# Patient Record
Sex: FEMALE | Race: WHITE | NOT HISPANIC OR LATINO | Employment: FULL TIME | URBAN - METROPOLITAN AREA
[De-identification: names, ages, dates, MRNs, and addresses within clinical notes are randomized per-mention and may not be internally consistent; named-entity substitution may affect disease eponyms.]

---

## 2017-02-24 ENCOUNTER — ALLSCRIPTS OFFICE VISIT (OUTPATIENT)
Dept: OTHER | Facility: OTHER | Age: 57
End: 2017-02-24

## 2017-05-19 ENCOUNTER — APPOINTMENT (OUTPATIENT)
Dept: LAB | Facility: CLINIC | Age: 57
End: 2017-05-19
Payer: COMMERCIAL

## 2017-05-19 ENCOUNTER — TRANSCRIBE ORDERS (OUTPATIENT)
Dept: LAB | Facility: CLINIC | Age: 57
End: 2017-05-19

## 2017-05-19 DIAGNOSIS — R09.89 OTHER SYMPTOMS INVOLVING CARDIOVASCULAR SYSTEM: Primary | ICD-10-CM

## 2017-05-19 DIAGNOSIS — R09.89 OTHER SYMPTOMS INVOLVING CARDIOVASCULAR SYSTEM: ICD-10-CM

## 2017-05-19 LAB — VENIPUNCTURE: NORMAL

## 2017-05-19 PROCEDURE — 36415 COLL VENOUS BLD VENIPUNCTURE: CPT

## 2017-06-19 ENCOUNTER — TRANSCRIBE ORDERS (OUTPATIENT)
Dept: ADMINISTRATIVE | Facility: HOSPITAL | Age: 57
End: 2017-06-19

## 2017-06-19 DIAGNOSIS — E04.1 NONTOXIC UNINODULAR GOITER: Primary | ICD-10-CM

## 2017-06-26 ENCOUNTER — HOSPITAL ENCOUNTER (OUTPATIENT)
Dept: RADIOLOGY | Facility: HOSPITAL | Age: 57
Discharge: HOME | End: 2017-06-26
Attending: SURGERY
Payer: COMMERCIAL

## 2017-06-26 DIAGNOSIS — E04.1 NONTOXIC UNINODULAR GOITER: ICD-10-CM

## 2017-06-26 PROCEDURE — 76536 US EXAM OF HEAD AND NECK: CPT

## 2018-01-14 VITALS
DIASTOLIC BLOOD PRESSURE: 98 MMHG | HEART RATE: 80 BPM | BODY MASS INDEX: 20.76 KG/M2 | OXYGEN SATURATION: 98 % | WEIGHT: 103 LBS | HEIGHT: 59 IN | TEMPERATURE: 100 F | RESPIRATION RATE: 18 BRPM | SYSTOLIC BLOOD PRESSURE: 147 MMHG

## 2023-03-17 ENCOUNTER — PREP FOR PROCEDURE (OUTPATIENT)
Dept: GASTROENTEROLOGY | Facility: CLINIC | Age: 63
End: 2023-03-17

## 2023-03-17 ENCOUNTER — TELEPHONE (OUTPATIENT)
Dept: GASTROENTEROLOGY | Facility: CLINIC | Age: 63
End: 2023-03-17

## 2023-03-17 DIAGNOSIS — Z12.11 SCREENING FOR COLON CANCER: Primary | ICD-10-CM

## 2023-03-17 NOTE — TELEPHONE ENCOUNTER
03/17/23  Screened by: Tammie Kinsey    Referring Provider Dorsi    Pre- Screening: There is no height or weight on file to calculate BMI  Has patient been referred for a routine screening Colonoscopy? yes  Is the patient between 39-70 years old? yes      Previous Colonoscopy no   If yes:    Date:     Facility:     Reason:       SCHEDULING STAFF: If the patient is between 45yrs-49yrs, please advise patient to confirm benefits/coverage with their insurance company for a routine screening colonoscopy, some insurance carriers will only cover at [address] or older  If the patient is over 66years old, please schedule an office visit  Does the patient want to see a Gastroenterologist prior to their procedure OR are they having any GI symptoms? no    Has the patient been hospitalized or had abdominal surgery in the past 6 months? no    Does the patient use supplemental oxygen? no    Does the patient take Coumadin, Lovenox, Plavix, Elliquis, Xarelto, or other blood thinning medication? no    Has the patient had a stroke, cardiac event, or stent placed in the past year? no    SCHEDULING STAFF: If patient answers NO to above questions, then schedule procedure  If patient answers YES to above questions, then schedule office appointment  PASSED OA    If patient is between 45yrs - 49yrs, please advise patient that we will have to confirm benefits & coverage with their insurance company for a routine screening colonoscopy

## 2023-04-27 ENCOUNTER — ANESTHESIA (OUTPATIENT)
Dept: GASTROENTEROLOGY | Facility: AMBULARY SURGERY CENTER | Age: 63
End: 2023-04-27

## 2023-04-27 ENCOUNTER — ANESTHESIA EVENT (OUTPATIENT)
Dept: GASTROENTEROLOGY | Facility: AMBULARY SURGERY CENTER | Age: 63
End: 2023-04-27

## 2023-04-27 ENCOUNTER — HOSPITAL ENCOUNTER (OUTPATIENT)
Dept: GASTROENTEROLOGY | Facility: AMBULARY SURGERY CENTER | Age: 63
Setting detail: OUTPATIENT SURGERY
Discharge: HOME | End: 2023-04-27
Attending: INTERNAL MEDICINE | Admitting: INTERNAL MEDICINE

## 2023-04-27 VITALS
HEART RATE: 78 BPM | BODY MASS INDEX: 25.6 KG/M2 | WEIGHT: 127 LBS | RESPIRATION RATE: 18 BRPM | DIASTOLIC BLOOD PRESSURE: 75 MMHG | TEMPERATURE: 97.3 F | HEIGHT: 59 IN | SYSTOLIC BLOOD PRESSURE: 152 MMHG | OXYGEN SATURATION: 100 %

## 2023-04-27 DIAGNOSIS — Z12.11 SCREENING FOR COLON CANCER: ICD-10-CM

## 2023-04-27 RX ORDER — LIDOCAINE HYDROCHLORIDE 10 MG/ML
INJECTION, SOLUTION EPIDURAL; INFILTRATION; INTRACAUDAL; PERINEURAL AS NEEDED
Status: DISCONTINUED | OUTPATIENT
Start: 2023-04-27 | End: 2023-04-27

## 2023-04-27 RX ORDER — SODIUM CHLORIDE, SODIUM LACTATE, POTASSIUM CHLORIDE, CALCIUM CHLORIDE 600; 310; 30; 20 MG/100ML; MG/100ML; MG/100ML; MG/100ML
INJECTION, SOLUTION INTRAVENOUS CONTINUOUS PRN
Status: DISCONTINUED | OUTPATIENT
Start: 2023-04-27 | End: 2023-04-27

## 2023-04-27 RX ORDER — PROPOFOL 10 MG/ML
INJECTION, EMULSION INTRAVENOUS AS NEEDED
Status: DISCONTINUED | OUTPATIENT
Start: 2023-04-27 | End: 2023-04-27

## 2023-04-27 RX ORDER — PROPOFOL 10 MG/ML
INJECTION, EMULSION INTRAVENOUS CONTINUOUS PRN
Status: DISCONTINUED | OUTPATIENT
Start: 2023-04-27 | End: 2023-04-27

## 2023-04-27 RX ADMIN — PROPOFOL 30 MG: 10 INJECTION, EMULSION INTRAVENOUS at 10:06

## 2023-04-27 RX ADMIN — LIDOCAINE HYDROCHLORIDE 5 ML: 10 INJECTION, SOLUTION EPIDURAL; INFILTRATION; INTRACAUDAL; PERINEURAL at 10:03

## 2023-04-27 RX ADMIN — PROPOFOL 140 MCG/KG/MIN: 10 INJECTION, EMULSION INTRAVENOUS at 10:03

## 2023-04-27 RX ADMIN — SODIUM CHLORIDE, SODIUM LACTATE, POTASSIUM CHLORIDE, AND CALCIUM CHLORIDE: .6; .31; .03; .02 INJECTION, SOLUTION INTRAVENOUS at 09:45

## 2023-04-27 RX ADMIN — PROPOFOL 100 MG: 10 INJECTION, EMULSION INTRAVENOUS at 10:03

## 2023-04-27 NOTE — H&P
"History and Physical -  [facility]  Jeri Saint 57 y o  female MRN: [id number]                  HPI: Jeri Saint is a 57y o  year old female who presents for screening colonoscopy      REVIEW OF SYSTEMS: Per the HPI, and otherwise unremarkable  Historical Information   Past Medical History:   Diagnosis Date   • Anxiety    • Depression    • Fatty liver    • Hypercholesteremia    • Hypertension      Past Surgical History:   Procedure Laterality Date   • NO PAST SURGERIES       Social History   Social History     Substance and Sexual Activity   Alcohol Use Yes    Comment: socially     Social History     Substance and Sexual Activity   Drug Use Not Currently     Social History     Tobacco Use   Smoking Status Former   • Years: 25 00   • Types: Cigarettes   • Passive exposure: Past   Smokeless Tobacco Never     History reviewed  No pertinent family history  Meds/Allergies     (Not in a hospital admission)      No Known Allergies    Objective     /56   Pulse 66   Temp (!) 97 3 °F (36 3 °C) (Temporal)   Resp 18   Ht 4' 11\" (1 499 m)   Wt 57 6 kg (127 lb)   SpO2 100%   BMI 25 65 kg/m²       PHYSICAL EXAM    Gen: NAD  CV: RRR  CHEST: Clear  ABD: soft, NT/ND  EXT: no edema      ASSESSMENT/PLAN:  This is a 57y o  year old female here for colonoscopy, and she is stable and optimized for her procedure        "

## 2023-04-27 NOTE — ANESTHESIA POSTPROCEDURE EVALUATION
Post-Op Assessment Note    CV Status:  Stable       Mental Status:  Sleepy   Hydration Status:  Stable   PONV Controlled:  Controlled   Airway Patency:  Patent      Post Op Vitals Reviewed: Yes      Staff: CRNA         No notable events documented      BP   132/65   Temp     Pulse  76   Resp   20   SpO2   100

## 2023-06-22 ENCOUNTER — OFFICE VISIT (OUTPATIENT)
Dept: GASTROENTEROLOGY | Facility: CLINIC | Age: 63
End: 2023-06-22

## 2023-06-22 VITALS
SYSTOLIC BLOOD PRESSURE: 172 MMHG | BODY MASS INDEX: 25.6 KG/M2 | DIASTOLIC BLOOD PRESSURE: 99 MMHG | WEIGHT: 127 LBS | HEIGHT: 59 IN | HEART RATE: 85 BPM

## 2023-06-22 DIAGNOSIS — R79.89 ELEVATED LFTS: ICD-10-CM

## 2023-06-22 DIAGNOSIS — R11.2 NAUSEA AND VOMITING, UNSPECIFIED VOMITING TYPE: ICD-10-CM

## 2023-06-22 DIAGNOSIS — R10.13 EPIGASTRIC PAIN: Primary | ICD-10-CM

## 2023-06-22 DIAGNOSIS — K63.5 POLYP OF COLON, UNSPECIFIED PART OF COLON, UNSPECIFIED TYPE: ICD-10-CM

## 2023-06-22 NOTE — PROGRESS NOTES
[facility] - Outpatient Follow-up Note  Monica Wendi 57 y o  female MRN: [id number]  Encounter: [id number]          ASSESSMENT AND PLAN:        1  Epigastric pain  Patient with pain which is upper endoscopy for  evaluation and she does intermittent nausea and vomiting as below and will plan for EGD for further evaluation  We will evaluate for gastritis peptic ulcer disease as she does take NSAIDs every couple days    2  Nausea and vomiting  Patient with nausea which occurs about once a month, no biliary abnormality seen on recent ultrasound or CAT scan    3  Elevated LFTs  Patient with elevated LFTs and when reviewing it appears that she has a chronically elevated alkaline phosphatase and did have alk phos isoenzymes which did not reveal any increased fractionation but she also had a positive hepatitis C antibody but HCVRNA PCR was negative, will order serologic work-up    4  Polyp of colon, unspecified part of colon, unspecified type  Patient with history of colon polyps with recent colonoscopy in April of this year will need repeat colonoscopy in 2028 for colon polyps    ______________________________________________________________________    SUBJECTIVE:      78-year-old female who presents today for office visit     Patient was seen in ER for nausea vomiting and epigastric pain  CAT scan was done at that time which showed no acute inflammatory changes  Patient was noted at that time to have a mildly elevated alkaline phosphatase at 118 and AST of 59 otherwise LFTs were normal     Recently had colonoscopy which showed as well as 2 small internal hemorrhoids but all other observed locations were normal   Polyps were tubular adenoma as well as hyperplastic polyp and recommended repeat colonoscopy in 5 years  Pt currently reports intermittent episodes of vomiting and abdominal pain  Vomiting occurs about once per month or every other month   States it usually consists of food and denies hematemesis or coffee-ground appearance  She thinks that it may be related to stress  She has not noticed any triggers such as dietary triggers  Abdominal pain is intermittent and overall infrequent but localized to the epigastric area  She denies diarrhea but reports that she will experience constipation at times with associated bloating and sometimes takes Metamucil  She denies dysphagia, odynophagia, heartburn, or skin changes  She reports that she drinks 1-2 glasses of wine every day or every other day  She takes 2 tablets of Advil every couple of days if she feels achy or has a headache  Her appetite has been good and she tries to avoid eating greasy foods  She notes overall weight gain since she quit smoking about a year ago  Family history is significant for grandmother with diverticulosis, denies any known family history of GI malignancy  Patient had reported that she saw someone in [place] and it appears that she had elastography as well as an ultrasound of her liver and the elastography showed Metavir score of F2-F3 indicating moderate fibrosis  She also had a regular ultrasound which just revealed hepatic steatosis  Common bile duct was normal in size with no evidence of cholelithiasis within the gallbladder  REVIEW OF SYSTEMS IS OTHERWISE NEGATIVE  Historical Information   Past Medical History:   Diagnosis Date   • Anxiety    • Depression    • Fatty liver    • Hypercholesteremia    • Hypertension      Past Surgical History:   Procedure Laterality Date   • NO PAST SURGERIES       Social History   Social History     Substance and Sexual Activity   Alcohol Use Yes    Comment: socially     Social History     Substance and Sexual Activity   Drug Use Not Currently     Social History     Tobacco Use   Smoking Status Former   • Years: 25 00   • Types: Cigarettes   • Passive exposure: Past   Smokeless Tobacco Never     History reviewed  No pertinent family history      Meds/Allergies "      Current Outpatient Medications:   •  ALPRAZolam (XANAX) 0 25 mg tablet  •  irbesartan (AVAPRO) 75 mg tablet  •  Multiple Vitamins-Minerals (MULTIVITAMIN ADULT EXTRA C PO)  •  ondansetron (Zofran ODT) 4 mg disintegrating tablet  •  Rosuvastatin Calcium (CRESTOR PO)  •  sertraline (ZOLOFT) 100 mg tablet    No Known Allergies        Objective     Blood pressure (!) 172/99, pulse 85, height 4' 11\" (1 499 m), weight 57 6 kg (127 lb)  Body mass index is 25 65 kg/m²  PHYSICAL EXAM:      General Appearance:   Alert, cooperative, no distress   HEENT:   Normocephalic, atraumatic, anicteric      Neck:  Supple, symmetrical, trachea midline   Lungs:   Clear to auscultation bilaterally; no rales, rhonchi or wheezing; respirations unlabored    Heart[de-identified]   Regular rate and rhythm; no murmur, rub, or gallop  Abdomen:   Soft, non-tender, non-distended; normal bowel sounds; no masses, no organomegaly    Genitalia:   Deferred    Rectal:   Deferred    Extremities:  No cyanosis, clubbing or edema    Pulses:  2+ and symmetric    Skin:  No jaundice, rashes, or lesions    Lymph nodes:  No palpable cervical lymphadenopathy        Lab Results:   No visits with results within 1 Day(s) from this visit     Latest known visit with results is:   Hospital Outpatient Visit on 04/27/2023   Component Date Value   • Case Report 04/27/2023                      Value:Surgical Pathology Report                         Case: [id number]                                   Authorizing Provider:  Donovan Blunt MD Collected:           04/27/2023 1012              Ordering Location:     [facility]   Received:            04/27/2023 [address]                                                                       Pathologist:           Steven Mcmullen MD                                                         Specimens:   A) - Large Intestine, Hepatic Flexure, polyp bx                             " B) - Rectum, polyp bx                                                                     • Final Diagnosis 04/27/2023                      Value: This result contains rich text formatting which cannot be displayed here  • Additional Information 04/27/2023                      Value: This result contains rich text formatting which cannot be displayed here  • Synoptic Checklist 04/27/2023                      Value:                            COLON/RECTUM POLYP FORM - GI - All Specimens                                                                                     :    Adenoma(s)                                                         : Other     • Gross Description 04/27/2023                      Value: This result contains rich text formatting which cannot be displayed here  Radiology Results:   No results found    Answers for HPI/ROS submitted by the patient on 6/15/2023  Chronicity: recurrent  Onset: more than 1 month ago  Onset quality: undetermined  Frequency: every several days  Episode duration: 6 Hours  Progression since onset: gradually worsening  Pain location: epigastric region  Pain - numeric: 5/10  Pain quality: a sensation of fullness  Radiates to: does not radiate  anorexia: No  arthralgias: No  belching: Yes  constipation: Yes  diarrhea: No  dysuria: No  fever: No  flatus: Yes  frequency: No  headaches: No  hematochezia: No  hematuria: No  melena: No  myalgias: No  nausea: Yes  weight loss: No  vomiting: Yes  Aggravated by: certain positions  Relieved by: nothing  Diagnostic workup: CT scan, lower endoscopy, ultrasound

## 2023-07-01 ENCOUNTER — HOSPITAL ENCOUNTER (EMERGENCY)
Facility: HOSPITAL | Age: 63
Discharge: HOME | End: 2023-07-01
Attending: EMERGENCY MEDICINE
Payer: COMMERCIAL

## 2023-07-01 VITALS
SYSTOLIC BLOOD PRESSURE: 192 MMHG | TEMPERATURE: 98.6 F | RESPIRATION RATE: 18 BRPM | BODY MASS INDEX: 26.58 KG/M2 | DIASTOLIC BLOOD PRESSURE: 99 MMHG | HEART RATE: 78 BPM | OXYGEN SATURATION: 98 % | WEIGHT: 131.6 LBS

## 2023-07-01 DIAGNOSIS — L08.9 WOUND INFECTION: Primary | ICD-10-CM

## 2023-07-01 DIAGNOSIS — R59.9 SWOLLEN GLAND: ICD-10-CM

## 2023-07-01 DIAGNOSIS — T14.8XXA WOUND INFECTION: Primary | ICD-10-CM

## 2023-07-01 PROCEDURE — 87070 CULTURE OTHR SPECIMN AEROBIC: CPT | Performed by: PHYSICIAN ASSISTANT

## 2023-07-01 PROCEDURE — 99283 EMERGENCY DEPT VISIT LOW MDM: CPT

## 2023-07-01 PROCEDURE — 87205 SMEAR GRAM STAIN: CPT | Performed by: PHYSICIAN ASSISTANT

## 2023-07-01 RX ORDER — AMOXICILLIN AND CLAVULANATE POTASSIUM 875; 125 MG/1; MG/1
1 TABLET, FILM COATED ORAL EVERY 12 HOURS SCHEDULED
Qty: 20 TABLET | Refills: 0 | Status: SHIPPED | OUTPATIENT
Start: 2023-07-01 | End: 2023-07-11

## 2023-07-01 NOTE — ED PROVIDER NOTES
History  Chief Complaint   Patient presents with   • Cellulitis     States fell down steps a month ago and has wound left lower leg. Not healing and reddened, was squeezing today and has bloody    • Neck Swelling     States started yesterday with swelling right neck , mild pain      68-year-old female presenting today with multiple complaints, relays that she fell down concrete steps about a month ago and sustained a wound to the left lower extremity. States that generally this is appearing much better than what it was however she and her significant other noticed drainage today, unsure if puslike. Removed a scab. Note some warmth and redness surrounding the wound. Has not had any tracking no calf pain or swelling. States that yesterday she noticed a gland in the right portion of her neck that seems slightly swollen and tender which is overall gone down. Has not been recently sick. No dental pain however relays that she does have dental issues. Denies weight loss, night sweats, nausea, vomiting, chest pain, shortness of breath, abdominal pain, calf pain or swelling, sore throat, dental pain. Differential includes but is not limited to adenopathy, cellulitis, hematoma, viral illness, dental infection. Prior to Admission Medications   Prescriptions Last Dose Informant Patient Reported? Taking?    ALPRAZolam (XANAX) 0.25 mg tablet  Self Yes No   Sig: 3 (three) times a day as needed   Multiple Vitamins-Minerals (MULTIVITAMIN ADULT EXTRA C PO)  Self Yes No   Sig: Take 1 tablet by mouth daily   Rosuvastatin Calcium (CRESTOR PO)  Self Yes No   Sig: Take by mouth in the morning   irbesartan (AVAPRO) 75 mg tablet  Self Yes No   Sig: Take 75 mg by mouth daily at bedtime   ondansetron (Zofran ODT) 4 mg disintegrating tablet  Self No No   Sig: Take 1 tablet (4 mg total) by mouth every 6 (six) hours as needed for nausea or vomiting   sertraline (ZOLOFT) 100 mg tablet  Self Yes No   Sig: Take 100 mg by mouth daily      Facility-Administered Medications: None       Past Medical History:   Diagnosis Date   • Anxiety    • Depression    • Fatty liver    • Hypercholesteremia    • Hypertension        Past Surgical History:   Procedure Laterality Date   • NO PAST SURGERIES         History reviewed. No pertinent family history. I have reviewed and agree with the history as documented. E-Cigarette/Vaping   • E-Cigarette Use Never User      E-Cigarette/Vaping Substances     Social History     Tobacco Use   • Smoking status: Former     Years: 25.00     Types: Cigarettes     Passive exposure: Past   • Smokeless tobacco: Never   Vaping Use   • Vaping Use: Never used   Substance Use Topics   • Alcohol use: Yes     Comment: daily 1-2 glasses of wine   • Drug use: Never       Review of Systems   Constitutional: Negative. HENT: Negative. Eyes: Negative. Respiratory: Negative. Cardiovascular: Negative. Gastrointestinal: Negative. Endocrine: Negative. Genitourinary: Negative. Musculoskeletal: Negative. Skin: Positive for color change and wound. Negative for pallor and rash. Allergic/Immunologic: Negative. Neurological: Negative. Hematological: Negative. Psychiatric/Behavioral: Negative. All other systems reviewed and are negative. Physical Exam  Physical Exam  Vitals and nursing note reviewed. Constitutional:       General: She is not in acute distress. Appearance: She is well-developed. She is not diaphoretic. HENT:      Head: Normocephalic and atraumatic. Right Ear: External ear normal.      Left Ear: External ear normal.      Nose: Nose normal.      Mouth/Throat:      Mouth: Mucous membranes are moist.      Pharynx: Oropharynx is clear. No oropharyngeal exudate or posterior oropharyngeal erythema. Eyes:      General: No scleral icterus. Right eye: No discharge. Left eye: No discharge.       Conjunctiva/sclera: Conjunctivae normal.      Pupils: Pupils are equal, round, and reactive to light. Cardiovascular:      Rate and Rhythm: Normal rate and regular rhythm. Pulses: Normal pulses. Heart sounds: Normal heart sounds. No murmur heard. No friction rub. No gallop. Pulmonary:      Effort: Pulmonary effort is normal. No respiratory distress. Breath sounds: Normal breath sounds. No stridor. No wheezing, rhonchi or rales. Chest:      Chest wall: No tenderness. Abdominal:      General: Bowel sounds are normal. There is no distension. Palpations: Abdomen is soft. There is no mass. Tenderness: There is no abdominal tenderness. There is no guarding or rebound. Hernia: No hernia is present. Musculoskeletal:      Cervical back: Normal range of motion and neck supple. No rigidity or tenderness. Lymphadenopathy:      Cervical: Cervical adenopathy present. Skin:     General: Skin is warm and dry. Capillary Refill: Capillary refill takes less than 2 seconds. Coloration: Skin is not pale. Findings: Erythema present. No rash. Neurological:      General: No focal deficit present. Mental Status: She is alert and oriented to person, place, and time. Mental status is at baseline. Vital Signs  ED Triage Vitals [07/01/23 1140]   Temperature Pulse Respirations Blood Pressure SpO2   98.6 °F (37 °C) 78 18 (!) 192/99 98 %      Temp Source Heart Rate Source Patient Position - Orthostatic VS BP Location FiO2 (%)   Tympanic Monitor Sitting Left arm --      Pain Score       4           Vitals:    07/01/23 1140   BP: (!) 192/99   Pulse: 78   Patient Position - Orthostatic VS: Sitting         Visual Acuity      ED Medications  Medications - No data to display    Diagnostic Studies  Results Reviewed     Procedure Component Value Units Date/Time    Wound culture and Gram stain [[id number]] Collected: 07/01/23 1219    Lab Status:  In process Specimen: Wound from Leg, Left Updated: 07/01/23 1228                 No orders to display              Procedures  Procedures         ED Course                               SBIRT 22yo+    Flowsheet Row Most Recent Value   Initial Alcohol Screen: US AUDIT-C     1. How often do you have a drink containing alcohol? 6 Filed at: 07/01/2023 1143   2. How many drinks containing alcohol do you have on a typical day you are drinking? 1 Filed at: 07/01/2023 1143   3b. FEMALE Any Age, or MALE 65+: How often do you have 4 or more drinks on one occassion? 0 Filed at: 07/01/2023 1143   Audit-C Score 7 Filed at: 07/01/2023 1143   Full Alcohol Screen: US AUDIT    4. How often during the last year have you found that you were not able to stop drinking once you had started? 0 Filed at: 07/01/2023 1143   5. How often during past year have you failed to do what was normally expected of you because of drinking? 0 Filed at: 07/01/2023 1143   6. How often in past year have you needed a first drink in the morning to get yourself going after a heavy drinking session? 0 Filed at: 07/01/2023 1143   7. How often in past year have you had feeling of guilt or remorse after drinking? 0 Filed at: 07/01/2023 1143   8. How often in past year have you been unable to remember what happened night before because you had been drinking? 0 Filed at: 07/01/2023 1143   9. Have you or someone else been injured as a result of your drinking? 0 Filed at: 07/01/2023 1143   10. Has a relative, friend, doctor or other health worker been concerned about your drinking and suggested you cut down?  0 Filed at: 07/01/2023 1143   AUDIT Total Score 7 Filed at: 07/01/2023 1143   VASQUEZ: How many times in the past year have you. .. Used an illegal drug or used a prescription medication for non-medical reasons?  Never Filed at: 07/01/2023 1143                    Medical Decision Making  When patient initially sustained the injury it sounds as though patient developed a hematoma, as bruising had developed to the ankle and foot without injury to this area.  Given some purulence as well as erythema and warmth will start on antibiotics. Patient's right cervical adenopathy is noted on exam, very mild, patient states that this is going down no other adenopathy noted on exam, patient incidentally found this yesterday. She does have an upcoming PCP appointment, will have her follow-up with PCP for resolution of symptoms in the meantime we will treat for secondary wound infection. Strict return precautions for any worsening. Patient verbalized understanding and agrees with the above assessment and plan. Swollen gland: acute illness or injury  Wound infection: acute illness or injury  Amount and/or Complexity of Data Reviewed  Labs: ordered. Risk  Prescription drug management. Disposition  Final diagnoses:   Wound infection   Swollen gland     Time reflects when diagnosis was documented in both MDM as applicable and the Disposition within this note     Time User Action Codes Description Comment    7/1/2023 12:13 PM Debora Torres Add [T14. 8XXA,  L08.9] Wound infection     7/1/2023 12:13 PM Debora Torres Add [R59.9] Swollen gland       ED Disposition     ED Disposition   Discharge    Condition   Stable    Date/Time   Sat Jul 1, 2023 12:13 PM    Comment   Jannie Baltazar discharge to home/self care.                Follow-up Information     Follow up With Specialties Details Why Contact Info Additional [address] [facility] Emergency Department Emergency Medicine Go to  If symptoms worsen, otherwise please follow up with your family doctor [address] [id number] [facility] Emergency Department, [facility], [address]          Discharge Medication List as of 7/1/2023 12:14 PM      START taking these medications    Details   amoxicillin-clavulanate (AUGMENTIN) 875-125 mg per tablet Take 1 tablet by mouth every 12 (twelve) hours for 10 days, Starting Sat 7/1/2023, Until Tue 7/11/2023, Normal         CONTINUE these medications which have NOT CHANGED    Details   ALPRAZolam (XANAX) 0.25 mg tablet 3 (three) times a day as needed, Starting Thu 5/18/2017, Historical Med      irbesartan (AVAPRO) 75 mg tablet Take 75 mg by mouth daily at bedtime, Historical Med      Multiple Vitamins-Minerals (MULTIVITAMIN ADULT EXTRA C PO) Take 1 tablet by mouth daily, Historical Med      ondansetron (Zofran ODT) 4 mg disintegrating tablet Take 1 tablet (4 mg total) by mouth every 6 (six) hours as needed for nausea or vomiting, Starting Sun 4/16/2023, Normal      Rosuvastatin Calcium (CRESTOR PO) Take by mouth in the morning, Historical Med      sertraline (ZOLOFT) 100 mg tablet Take 100 mg by mouth daily, Historical Med             No discharge procedures on file.     PDMP Review     None          ED Provider  Electronically Signed by           Lauren Robledo PA-C  07/01/23 5908

## 2023-07-04 LAB
BACTERIA WND AEROBE CULT: NO GROWTH
GRAM STN SPEC: NORMAL

## 2023-07-10 LAB — HCV AB SER-ACNC: NORMAL

## 2023-08-03 ENCOUNTER — ANESTHESIA (OUTPATIENT)
Dept: GASTROENTEROLOGY | Facility: AMBULARY SURGERY CENTER | Age: 63
End: 2023-08-03

## 2023-08-03 ENCOUNTER — HOSPITAL ENCOUNTER (OUTPATIENT)
Dept: GASTROENTEROLOGY | Facility: AMBULARY SURGERY CENTER | Age: 63
Setting detail: OUTPATIENT SURGERY
End: 2023-08-03
Attending: INTERNAL MEDICINE
Payer: COMMERCIAL

## 2023-08-03 ENCOUNTER — ANESTHESIA EVENT (OUTPATIENT)
Dept: GASTROENTEROLOGY | Facility: AMBULARY SURGERY CENTER | Age: 63
End: 2023-08-03

## 2023-08-03 VITALS
BODY MASS INDEX: 26.46 KG/M2 | RESPIRATION RATE: 18 BRPM | TEMPERATURE: 98.4 F | SYSTOLIC BLOOD PRESSURE: 141 MMHG | HEART RATE: 78 BPM | WEIGHT: 131 LBS | OXYGEN SATURATION: 99 % | DIASTOLIC BLOOD PRESSURE: 63 MMHG

## 2023-08-03 DIAGNOSIS — R10.13 EPIGASTRIC PAIN: ICD-10-CM

## 2023-08-03 PROCEDURE — 88305 TISSUE EXAM BY PATHOLOGIST: CPT | Performed by: PATHOLOGY

## 2023-08-03 PROCEDURE — 88342 IMHCHEM/IMCYTCHM 1ST ANTB: CPT | Performed by: PATHOLOGY

## 2023-08-03 PROCEDURE — 43239 EGD BIOPSY SINGLE/MULTIPLE: CPT | Performed by: INTERNAL MEDICINE

## 2023-08-03 PROCEDURE — 88313 SPECIAL STAINS GROUP 2: CPT | Performed by: PATHOLOGY

## 2023-08-03 PROCEDURE — 88341 IMHCHEM/IMCYTCHM EA ADD ANTB: CPT | Performed by: PATHOLOGY

## 2023-08-03 RX ORDER — LIDOCAINE HYDROCHLORIDE 10 MG/ML
INJECTION, SOLUTION EPIDURAL; INFILTRATION; INTRACAUDAL; PERINEURAL AS NEEDED
Status: DISCONTINUED | OUTPATIENT
Start: 2023-08-03 | End: 2023-08-03

## 2023-08-03 RX ORDER — PANTOPRAZOLE SODIUM 40 MG/1
40 TABLET, DELAYED RELEASE ORAL DAILY
Qty: 30 TABLET | Refills: 2 | Status: SHIPPED | OUTPATIENT
Start: 2023-08-03

## 2023-08-03 RX ORDER — PROPOFOL 10 MG/ML
INJECTION, EMULSION INTRAVENOUS AS NEEDED
Status: DISCONTINUED | OUTPATIENT
Start: 2023-08-03 | End: 2023-08-03

## 2023-08-03 RX ORDER — SODIUM CHLORIDE, SODIUM LACTATE, POTASSIUM CHLORIDE, CALCIUM CHLORIDE 600; 310; 30; 20 MG/100ML; MG/100ML; MG/100ML; MG/100ML
INJECTION, SOLUTION INTRAVENOUS CONTINUOUS PRN
Status: DISCONTINUED | OUTPATIENT
Start: 2023-08-03 | End: 2023-08-03

## 2023-08-03 RX ADMIN — PROPOFOL 50 MG: 10 INJECTION, EMULSION INTRAVENOUS at 09:08

## 2023-08-03 RX ADMIN — LIDOCAINE HYDROCHLORIDE 50 MG: 10 INJECTION, SOLUTION EPIDURAL; INFILTRATION; INTRACAUDAL at 09:06

## 2023-08-03 RX ADMIN — SODIUM CHLORIDE, SODIUM LACTATE, POTASSIUM CHLORIDE, AND CALCIUM CHLORIDE: .6; .31; .03; .02 INJECTION, SOLUTION INTRAVENOUS at 09:03

## 2023-08-03 RX ADMIN — PROPOFOL 50 MG: 10 INJECTION, EMULSION INTRAVENOUS at 09:07

## 2023-08-03 RX ADMIN — PROPOFOL 150 MG: 10 INJECTION, EMULSION INTRAVENOUS at 09:06

## 2023-08-03 NOTE — ANESTHESIA PREPROCEDURE EVALUATION
Procedure:  EGD    Relevant Problems   No relevant active problems        Physical Exam    Airway    Mallampati score: II  TM Distance: >3 FB  Neck ROM: full     Dental   No notable dental hx     Cardiovascular  Cardiovascular exam normal    Pulmonary  Pulmonary exam normal     Other Findings        Anesthesia Plan  ASA Score- 2     Anesthesia Type- IV sedation with anesthesia with ASA Monitors. Additional Monitors:   Airway Plan:           Plan Factors-Exercise tolerance (METS): >4 METS. Chart reviewed. Existing labs reviewed. Patient summary reviewed. Patient is not a current smoker. Induction-     Postoperative Plan-     Informed Consent- Anesthetic plan and risks discussed with patient. I personally reviewed this patient with the CRNA. Discussed and agreed on the Anesthesia Plan with the CRNA. Mendoza Schumacher

## 2023-08-03 NOTE — H&P
History and Physical -  [facility]  Sandrine Worthington 57 y.o. female MRN: [id number]                  HPI: Sandrine Worthington is a 57y.o. year old female who presents for GERD and epigastric pain      REVIEW OF SYSTEMS: Per the HPI, and otherwise unremarkable. Historical Information   Past Medical History:   Diagnosis Date   • Anxiety    • Depression    • Fatty liver    • Hypercholesteremia    • Hypertension      Past Surgical History:   Procedure Laterality Date   • NO PAST SURGERIES       Social History   Social History     Substance and Sexual Activity   Alcohol Use Yes    Comment: daily 1-2 glasses of wine     Social History     Substance and Sexual Activity   Drug Use Never     Social History     Tobacco Use   Smoking Status Former   • Years: 25.00   • Types: Cigarettes   • Passive exposure: Past   Smokeless Tobacco Never     History reviewed. No pertinent family history. Meds/Allergies     (Not in a hospital admission)      No Known Allergies    Objective     /58   Pulse 77   Temp 98.4 °F (36.9 °C) (Temporal)   Resp 18   Wt 59.4 kg (131 lb)   SpO2 (!) 2%   BMI 26.46 kg/m²       PHYSICAL EXAM    Gen: NAD  CV: RRR  CHEST: Clear  ABD: soft, NT/ND  EXT: no edema      ASSESSMENT/PLAN:  This is a 57y.o. year old female here for EGD, and she is stable and optimized for her procedure.

## 2023-08-03 NOTE — ANESTHESIA POSTPROCEDURE EVALUATION
Post-Op Assessment Note    CV Status:  Stable  Pain Score: 0    Pain management: adequate     Mental Status:  Arousable   Hydration Status:  Euvolemic   PONV Controlled:  Controlled   Airway Patency:  Patent      Post Op Vitals Reviewed: Yes      Staff: CRNA         No notable events documented.     /58   Temp      Pulse 77   Resp 14   SpO2 96

## 2023-08-10 PROCEDURE — 88342 IMHCHEM/IMCYTCHM 1ST ANTB: CPT | Performed by: PATHOLOGY

## 2023-08-10 PROCEDURE — 88341 IMHCHEM/IMCYTCHM EA ADD ANTB: CPT | Performed by: PATHOLOGY

## 2023-08-10 PROCEDURE — 88305 TISSUE EXAM BY PATHOLOGIST: CPT | Performed by: PATHOLOGY

## 2023-08-10 PROCEDURE — 88313 SPECIAL STAINS GROUP 2: CPT | Performed by: PATHOLOGY

## 2023-08-30 ENCOUNTER — OFFICE VISIT (OUTPATIENT)
Dept: GASTROENTEROLOGY | Facility: CLINIC | Age: 63
End: 2023-08-30
Payer: COMMERCIAL

## 2023-08-30 VITALS
HEART RATE: 83 BPM | SYSTOLIC BLOOD PRESSURE: 166 MMHG | WEIGHT: 131 LBS | HEIGHT: 59 IN | DIASTOLIC BLOOD PRESSURE: 98 MMHG | BODY MASS INDEX: 26.41 KG/M2

## 2023-08-30 DIAGNOSIS — R10.13 EPIGASTRIC PAIN: Primary | ICD-10-CM

## 2023-08-30 DIAGNOSIS — R14.0 BLOATING: ICD-10-CM

## 2023-08-30 DIAGNOSIS — K21.9 GASTROESOPHAGEAL REFLUX DISEASE WITHOUT ESOPHAGITIS: ICD-10-CM

## 2023-08-30 PROCEDURE — 99214 OFFICE O/P EST MOD 30 MIN: CPT | Performed by: INTERNAL MEDICINE

## 2023-08-30 RX ORDER — FAMOTIDINE 40 MG/1
40 TABLET, FILM COATED ORAL DAILY
Qty: 30 TABLET | Refills: 1 | Status: SHIPPED | OUTPATIENT
Start: 2023-08-30 | End: 2023-09-29

## 2023-08-30 NOTE — PROGRESS NOTES
Answers for HPI/ROS submitted by the patient on 8/29/2023  Chronicity: recurrent  Onset: more than 1 year ago  Onset quality: undetermined  Frequency: intermittently  Episode duration: 1 Days  Progression since onset: unchanged  Pain location: LUQ  Pain - numeric: 7/10  Pain quality: dull  Radiates to: does not radiate  anorexia: No  arthralgias: No  belching: Yes  constipation: No  diarrhea: Yes  dysuria: No  fever: No  flatus: Yes  frequency: No  hematochezia: No  hematuria: No  melena: No  myalgias: No  nausea: Yes  weight loss: No  vomiting: Yes  Aggravated by: eating  Relieved by: recumbency  Diagnostic workup: CT scan, GI consult, lower endoscopy, ultrasound, upper endoscopy    [facility] - Outpatient Follow-up Note  Russel Rodríguez 57 y.o. female MRN: [id number]  Encounter: [id number]          ASSESSMENT AND PLAN:      1. Epigastric pain  77-year-old female now come for follow-up after upper endoscopy, EGD showed small sliding hiatal hernia, mild gastritis, biopsy came back negative for H. pylori infection, she is taking pantoprazole 40 mg p.o. every morning and still having persistent symptoms including bloating, burping, no regurgitation and mild abdominal pain. Will order right upper quadrant ultrasound to rule out gallstone disease, will add famotidine 40 mg p.o. nightly, long discussion with the patient about strict antireflux diet and avoiding late dinner. We will follow-up in 3-month  - famotidine (PEPCID) 40 MG tablet; Take 1 tablet (40 mg total) by mouth daily  Dispense: 30 tablet; Refill: 1  -  right upper quadrant; Future    2. Bloating  Continue with Protonix 40 mg p.o. every morning and famotidine 40 mg p.o. nightly, endoscopy with biopsy result came back negative for H. pylori infection  - famotidine (PEPCID) 40 MG tablet; Take 1 tablet (40 mg total) by mouth daily  Dispense: 30 tablet; Refill: 1    3.  Gastroesophageal reflux disease without esophagitis  Continue with pantoprazole in the morning and add famotidine at nighttime  - famotidine (PEPCID) 40 MG tablet; Take 1 tablet (40 mg total) by mouth daily  Dispense: 30 tablet; Refill: 1    ______________________________________________________________________    SUBJECTIVE: Patient seen and examined, she come for follow-up. She had a recent EGD for epigastric pain, endoscopy result came back okay, she has a small sliding hiatal hernia with mild gastritis, all the biopsy came back negative, no evidence of H. pylori infection or Trammell's esophagus, she is taking pantoprazole in the morning but still having symptoms, she still complain about upper abdominal pain, bloated feeling, frequent burping, no regurgitation, she denying any nausea or vomiting, she denying any NSAID use, she is non-smoker, no chronic alcohol use, she has a issue with chronic nausea and she take Zofran as needed. She denying any family history of stomach cancer or colorectal cancer, she is up-to-date with screening colonoscopy      REVIEW OF SYSTEMS IS OTHERWISE NEGATIVE. Historical Information   Past Medical History:   Diagnosis Date   • Anxiety    • Depression    • Fatty liver    • Hypercholesteremia    • Hypertension      Past Surgical History:   Procedure Laterality Date   • NO PAST SURGERIES       Social History   Social History     Substance and Sexual Activity   Alcohol Use Yes    Comment: daily 1-2 glasses of wine     Social History     Substance and Sexual Activity   Drug Use Never     Social History     Tobacco Use   Smoking Status Former   • Years: 25.00   • Types: Cigarettes   • Passive exposure: Past   Smokeless Tobacco Never     History reviewed. No pertinent family history.     Meds/Allergies       Current Outpatient Medications:   •  ALPRAZolam (XANAX) 0.25 mg tablet  •  famotidine (PEPCID) 40 MG tablet  •  irbesartan (AVAPRO) 75 mg tablet  •  Multiple Vitamins-Minerals (MULTIVITAMIN ADULT EXTRA C PO)  •  ondansetron (Zofran ODT) 4 mg disintegrating tablet  •  pantoprazole (PROTONIX) 40 mg tablet  •  Rosuvastatin Calcium (CRESTOR PO)  •  sertraline (ZOLOFT) 100 mg tablet    No Known Allergies        Objective     Blood pressure 166/98, pulse 83, height 4' 11" (1.499 m), weight 59.4 kg (131 lb). Body mass index is 26.46 kg/m². PHYSICAL EXAM:      General Appearance:   Alert, cooperative, no distress   HEENT:   Normocephalic, atraumatic, anicteric.     Neck:  Supple, symmetrical, trachea midline   Lungs:   Clear to auscultation bilaterally; no rales, rhonchi or wheezing; respirations unlabored    Heart[de-identified]   Regular rate and rhythm; no murmur, rub, or gallop. Abdomen:   Soft, non-tender, non-distended; normal bowel sounds; no masses, no organomegaly    Genitalia:   Deferred    Rectal:   Deferred    Extremities:  No cyanosis, clubbing or edema    Pulses:  2+ and symmetric    Skin:  No jaundice, rashes, or lesions    Lymph nodes:  No palpable cervical lymphadenopathy        Lab Results:   No visits with results within 1 Day(s) from this visit.    Latest known visit with results is:   Hospital Outpatient Visit on 08/03/2023   Component Date Value   • Case Report 08/03/2023                      Value:Surgical Pathology Report                         Case: [id number]                                   Authorizing Provider:  Lore Rodriguez MD Collected:           08/03/2023 0908              Ordering Location:     [facility] Surgery   Received:            08/03/2023 [address] [place] [address].                                                                       Pathologist:           Lise Pate MD                                                                  Specimens:   A) - Duodenum, small bowel bx r/o duodenitis                                                        B) - Stomach, antrum bx r/o h pylori                                                                C) - Esophagus, lower esophagus bx r/o barretts                                           • Final Diagnosis 08/03/2023                      Value: This result contains rich text formatting which cannot be displayed here. • Additional Information 08/03/2023                      Value: This result contains rich text formatting which cannot be displayed here. • Gross Description 08/03/2023                      Value: This result contains rich text formatting which cannot be displayed here. Radiology Results:   EGD    Result Date: 8/3/2023  Narrative: Table formatting from the original result was not included. [address] [address] [facility] [address] [phone] DATE OF SERVICE: 8/03/23 PHYSICIAN(S): Attending: Ema Torres MD Fellow: No Staff Documented Procedure : EGD with Biopsies INDICATION: Epigastric pain POST-OP DIAGNOSIS: See the impression below. PREPROCEDURE: Informed consent was obtained for the procedure, including sedation. Risks of perforation, hemorrhage, adverse drug reaction and aspiration were discussed. The patient was placed in the left lateral decubitus position. Patient was explained about the risks and benefits of the procedure. Risks including but not limited to bleeding, infection, and perforation were explained in detail. Also explained about less than 100% sensitivity with the exam and other alternatives. PROCEDURE: EGD DETAILS OF PROCEDURE: Patient was taken to the procedure room where a time out was performed to confirm correct patient and correct procedure. The patient underwent monitored anesthesia care, which was administered by an anesthesia professional. The patient's blood pressure, heart rate, level of consciousness, respirations and oxygen were monitored throughout the procedure. The scope was introduced through the mouth and advanced to the second part of the duodenum. Retroflexion was performed in the cardia, fundus and incisura.  Prior to the procedure, the patient's H. Pylori status was unknown. The patient experienced no blood loss. The procedure was not difficult. The patient tolerated the procedure well. There were no apparent adverse events. ANESTHESIA INFORMATION: ASA: ASA status not filed in the log. Anesthesia Type: Anesthesia type not filed in the log.  MEDICATIONS: No administrations occurring from 0859 to 0911 on 08/03/23 FINDINGS: Moderate, localized erythematous mucosa in the antrum, duodenal bulb and 1st part of the duodenum, consistent with gastritis; performed cold forceps biopsy to rule out H. pylori Mild, localized erythematous mucosa in the lower third of the esophagus; performed cold forceps biopsy to rule out Trammell's esophagus Small sliding hiatal hernia (type I hiatal hernia) without Sanjuanita Colla lesions present:  Hill classification: Grade I SPECIMENS: ID Type Source Tests Collected by Time Destination 1 : small bowel bx r/o duodenitis Tissue Duodenum TISSUE EXAM Chyna Sr MD 8/3/2023  9:08 AM  2 : antrum bx r/o h pylori Tissue Stomach TISSUE EXAM Chyna Sr MD 8/3/2023  9:08 AM  3 : lower esophagus bx r/o barretts Tissue Esophagus TISSUE EXAM Chyna Sr MD 8/3/2023  9:09 AM      Impression: Moderate abnormal mucosa in the antrum, duodenal bulb and 1st part of the duodenum, consistent with gastritis; performed cold forceps biopsy to rule out H. pylori Mild erythematous mucosa in the lower third of the esophagus; performed cold forceps biopsy Small type I hiatal hernia        Mild gastritis, duodenitis RECOMMENDATION:  Await pathology results  Follow up with me in clinic    Pantoprazole 40 mg p.o. every morning     Anti   reflux diet, avoid late dinner and small meals   Chyna Sr MD

## 2023-09-15 ENCOUNTER — HOSPITAL ENCOUNTER (OUTPATIENT)
Dept: RADIOLOGY | Facility: HOSPITAL | Age: 63
Discharge: HOME | End: 2023-09-15
Attending: INTERNAL MEDICINE
Payer: COMMERCIAL

## 2023-09-15 DIAGNOSIS — R10.13 EPIGASTRIC PAIN: ICD-10-CM

## 2023-09-15 PROCEDURE — 76705 ECHO EXAM OF ABDOMEN: CPT

## 2023-10-18 DIAGNOSIS — K21.9 GASTROESOPHAGEAL REFLUX DISEASE WITHOUT ESOPHAGITIS: ICD-10-CM

## 2023-10-18 DIAGNOSIS — R10.13 EPIGASTRIC PAIN: ICD-10-CM

## 2023-10-18 DIAGNOSIS — R14.0 BLOATING: ICD-10-CM

## 2023-10-18 RX ORDER — PANTOPRAZOLE SODIUM 40 MG/1
40 TABLET, DELAYED RELEASE ORAL DAILY
Qty: 30 TABLET | Refills: 1 | Status: SHIPPED | OUTPATIENT
Start: 2023-10-18

## 2023-10-18 RX ORDER — FAMOTIDINE 40 MG/1
40 TABLET, FILM COATED ORAL DAILY
Qty: 30 TABLET | Refills: 0 | Status: SHIPPED | OUTPATIENT
Start: 2023-10-18 | End: 2023-11-17

## 2023-12-01 ENCOUNTER — OFFICE VISIT (OUTPATIENT)
Dept: GASTROENTEROLOGY | Facility: CLINIC | Age: 63
End: 2023-12-01
Payer: COMMERCIAL

## 2023-12-01 VITALS
HEART RATE: 80 BPM | DIASTOLIC BLOOD PRESSURE: 102 MMHG | HEIGHT: 58 IN | SYSTOLIC BLOOD PRESSURE: 165 MMHG | BODY MASS INDEX: 28.34 KG/M2 | WEIGHT: 135 LBS

## 2023-12-01 DIAGNOSIS — R79.89 ELEVATED LFTS: ICD-10-CM

## 2023-12-01 DIAGNOSIS — K21.9 GASTROESOPHAGEAL REFLUX DISEASE WITHOUT ESOPHAGITIS: ICD-10-CM

## 2023-12-01 DIAGNOSIS — K63.5 POLYP OF COLON, UNSPECIFIED PART OF COLON, UNSPECIFIED TYPE: ICD-10-CM

## 2023-12-01 DIAGNOSIS — R10.13 EPIGASTRIC PAIN: Primary | ICD-10-CM

## 2023-12-01 DIAGNOSIS — R14.0 BLOATING: ICD-10-CM

## 2023-12-01 PROCEDURE — 99214 OFFICE O/P EST MOD 30 MIN: CPT | Performed by: PHYSICIAN ASSISTANT

## 2023-12-01 RX ORDER — FAMOTIDINE 40 MG/1
40 TABLET, FILM COATED ORAL 2 TIMES DAILY
Qty: 30 TABLET | Refills: 2 | Status: SHIPPED | OUTPATIENT
Start: 2023-12-01 | End: 2023-12-31

## 2023-12-01 NOTE — PROGRESS NOTES
[facility] - Outpatient Follow-up Note  Lisa Daily 57 y.o. female MRN: [id number]  Encounter: [id number]          ASSESSMENT AND PLAN:      1. Epigastric pain  Patient with epigastric pain and had an EGD which was essentially unremarkable for any findings that would cause the pain and she did have a right upper quadrant ultrasound with questionable biliary dilatation of only 7 mm, will repeat LFTs and if persistent elevated LFTs may consider MRI MRCP, no gallbladder findings such as stones on ultrasound but will order HIDA scan with CCK for persistent bloating symptoms as well as vague nausea and epigastric pain with unknown etiology    2. Gastroesophageal reflux disease without esophagitis  Patient feels pantoprazole was not helping, will continue famotidine twice daily, repeat EGD in 2 to 3 years for possible Trammell's    3. Elevated LFTs  Patient with elevated LFTs, alk phos has been elevated with normal GGT and will order alk phos isoenzymes and repeat LFTs at this time    4. Polyp of colon, unspecified part of colon, unspecified type  Patient with history of colon polyp and colonoscopy recommended in 5 years for follow-up which will be 2028    ______________________________________________________________________    SUBJECTIVE:         47-year-old female who presents today for follow up office visit. .  Patient was seen in ER in April for nausea vomiting and epigastric pain. CAT scan was done at that time which showed no acute inflammatory changes. Patient was noted at that time to have a mildly elevated alkaline phosphatase at 118 and AST of 59 otherwise LFTs were normal at that time. ALP was elevated at 169 with repeat labs. Serologic work up was normal, GGT was normal. RUQ US The common duct is borderline at 7 mm and similar to recent CT. If there is high degree of clinical suspicion, MRI/MRCP follow-up may be helpful.  Equivocal smooth increased echogenicity of the liver suggests mild fatty infiltration. Recently in April, she  had colonoscopy which showed as well as 2 small internal hemorrhoids but all other observed locations were normal.  Polyps were tubular adenoma as well as hyperplastic polyp and recommended repeat colonoscopy in 5 years. Pt currently reports intermittent episodes of vomiting and abdominal pain. EGD performed showing moderate abnormal mucosa in duodenal bulb and mild gastritis with small hiatal hernia. Was prescribed pantoprazole. Pathology from EGD did show possible Trammell's esophagus if the biopsies were taken greater than 1 cm above the EG junction, recommended repeat in 2-3 years. she currently still has epigastric pain intermittently but today is feeling better. Does get nausea symptoms but they have also improved. Denies any definitive fatty food intolerance. Does admit to bloating symptoms and she feels like she had to go up in pant size. She otherwise states that she was constipated at times but that has improved. Feels like the pantoprazole was not really helping. She does take Pepcid at night and she felt like that was helping. REVIEW OF SYSTEMS IS OTHERWISE NEGATIVE.       Historical Information   Past Medical History:   Diagnosis Date    Anxiety     Depression     Fatty liver     Hypercholesteremia     Hypertension      Past Surgical History:   Procedure Laterality Date    COLONOSCOPY      NO PAST SURGERIES      UPPER GASTROINTESTINAL ENDOSCOPY       Social History   Social History     Substance and Sexual Activity   Alcohol Use Yes    Comment: daily 1-2 glasses of wine     Social History     Substance and Sexual Activity   Drug Use Never     Social History     Tobacco Use   Smoking Status Former    Years: 25.00    Types: Cigarettes    Passive exposure: Past   Smokeless Tobacco Never     Family History   Problem Relation Age of Onset    Diverticulitis Paternal Grandmother        Meds/Allergies       Current Outpatient Medications:     ALPRAZolam (XANAX) 0.25 mg tablet    famotidine (PEPCID) 40 MG tablet    irbesartan (AVAPRO) 75 mg tablet    Multiple Vitamins-Minerals (MULTIVITAMIN ADULT EXTRA C PO)    ondansetron (Zofran ODT) 4 mg disintegrating tablet    pantoprazole (PROTONIX) 40 mg tablet    Rosuvastatin Calcium (CRESTOR PO)    sertraline (ZOLOFT) 100 mg tablet    No Known Allergies        Objective     Blood pressure (!) 165/102, pulse 80, height 4' 10" (1.473 m), weight 61.2 kg (135 lb). Body mass index is 28.22 kg/m². PHYSICAL EXAM:      General Appearance:   Alert, cooperative, no distress   HEENT:   Normocephalic, atraumatic, anicteric. Neck:  Supple, symmetrical, trachea midline   Lungs:   Clear to auscultation bilaterally; no rales, rhonchi or wheezing; respirations unlabored    Heart[de-identified]   Regular rate and rhythm; no murmur, rub, or gallop. Abdomen:   Soft, non-tender, non-distended; normal bowel sounds; no masses, no organomegaly    Genitalia:   Deferred    Rectal:   Deferred    Extremities:  No cyanosis, clubbing or edema    Pulses:  2+ and symmetric    Skin:  No jaundice, rashes, or lesions    Lymph nodes:  No palpable cervical lymphadenopathy        Lab Results:   No visits with results within 1 Day(s) from this visit.    Latest known visit with results is:   Hospital Outpatient Visit on 08/03/2023   Component Date Value    Case Report 08/03/2023                      Value:Surgical Pathology Report                         Case: [id number]                                   Authorizing Provider:  Kriss Castellanos MD Collected:           08/03/2023 0908              Ordering Location:     [facility] Surgery   Received:            08/03/2023 [address] [place] [address].                                                                       Pathologist:           Na Lozano MD                                                                  Specimens:   A) - Duodenum, small bowel bx r/o duodenitis                                                        B) - Stomach, antrum bx r/o h pylori                                                                C) - Esophagus, lower esophagus bx r/o barretts                                            Final Diagnosis 08/03/2023                      Value: This result contains rich text formatting which cannot be displayed here. Additional Information 08/03/2023                      Value: This result contains rich text formatting which cannot be displayed here. Gross Description 08/03/2023                      Value: This result contains rich text formatting which cannot be displayed here. Radiology Results:   No results found.

## 2024-01-03 DIAGNOSIS — R10.13 EPIGASTRIC PAIN: ICD-10-CM

## 2024-01-04 RX ORDER — PANTOPRAZOLE SODIUM 40 MG/1
40 TABLET, DELAYED RELEASE ORAL DAILY
Qty: 30 TABLET | Refills: 5 | Status: SHIPPED | OUTPATIENT
Start: 2024-01-04

## 2024-01-05 ENCOUNTER — HOSPITAL ENCOUNTER (OUTPATIENT)
Dept: RADIOLOGY | Facility: HOSPITAL | Age: 64
Discharge: HOME | End: 2024-01-05
Payer: MEDICARE

## 2024-01-05 DIAGNOSIS — R10.13 EPIGASTRIC PAIN: ICD-10-CM

## 2024-01-05 PROCEDURE — A9537 TC99M MEBROFENIN: HCPCS

## 2024-01-05 PROCEDURE — 78227 HEPATOBIL SYST IMAGE W/DRUG: CPT

## 2024-01-05 PROCEDURE — G1004 CDSM NDSC: HCPCS

## 2024-01-05 RX ORDER — SINCALIDE 5 UG/5ML
0.02 INJECTION, POWDER, LYOPHILIZED, FOR SOLUTION INTRAVENOUS
Status: COMPLETED | OUTPATIENT
Start: 2024-01-05 | End: 2024-01-05

## 2024-01-05 RX ADMIN — SINCALIDE 1.2 MCG: 5 INJECTION, POWDER, LYOPHILIZED, FOR SOLUTION INTRAVENOUS at 12:33

## 2024-01-11 DIAGNOSIS — R14.0 BLOATING: ICD-10-CM

## 2024-01-11 DIAGNOSIS — K21.9 GASTROESOPHAGEAL REFLUX DISEASE WITHOUT ESOPHAGITIS: ICD-10-CM

## 2024-01-11 DIAGNOSIS — R10.13 EPIGASTRIC PAIN: ICD-10-CM

## 2024-01-11 RX ORDER — FAMOTIDINE 40 MG/1
TABLET, FILM COATED ORAL
Qty: 60 TABLET | Refills: 1 | Status: SHIPPED | OUTPATIENT
Start: 2024-01-11

## 2024-02-29 ENCOUNTER — TELEPHONE (OUTPATIENT)
Age: 64
End: 2024-02-29

## 2024-02-29 ENCOUNTER — OFFICE VISIT (OUTPATIENT)
Dept: GASTROENTEROLOGY | Facility: CLINIC | Age: 64
End: 2024-02-29
Payer: MEDICARE

## 2024-02-29 VITALS
WEIGHT: 135.2 LBS | BODY MASS INDEX: 28.38 KG/M2 | SYSTOLIC BLOOD PRESSURE: 139 MMHG | HEART RATE: 78 BPM | DIASTOLIC BLOOD PRESSURE: 91 MMHG | HEIGHT: 58 IN

## 2024-02-29 DIAGNOSIS — R14.0 BLOATING: ICD-10-CM

## 2024-02-29 DIAGNOSIS — K21.9 GASTROESOPHAGEAL REFLUX DISEASE WITHOUT ESOPHAGITIS: Primary | ICD-10-CM

## 2024-02-29 DIAGNOSIS — K22.70 BARRETT'S ESOPHAGUS DETERMINED BY BIOPSY: ICD-10-CM

## 2024-02-29 DIAGNOSIS — R11.2 NAUSEA AND VOMITING: ICD-10-CM

## 2024-02-29 PROCEDURE — 99213 OFFICE O/P EST LOW 20 MIN: CPT | Performed by: INTERNAL MEDICINE

## 2024-02-29 PROCEDURE — G2211 COMPLEX E/M VISIT ADD ON: HCPCS | Performed by: INTERNAL MEDICINE

## 2024-02-29 RX ORDER — ONDANSETRON 4 MG/1
4 TABLET, ORALLY DISINTEGRATING ORAL EVERY 6 HOURS PRN
Qty: 20 TABLET | Refills: 1 | Status: SHIPPED | OUTPATIENT
Start: 2024-02-29

## 2024-02-29 RX ORDER — ESOMEPRAZOLE MAGNESIUM 40 MG/1
40 CAPSULE, DELAYED RELEASE ORAL
Qty: 60 CAPSULE | Refills: 3 | Status: SHIPPED | OUTPATIENT
Start: 2024-02-29

## 2024-02-29 NOTE — PROGRESS NOTES
[facility] - Outpatient Follow-up Note  Arabella Mathews 63 y.o. female MRN: [id number]  Encounter: [id number]          ASSESSMENT AND PLAN:      1. Gastroesophageal reflux disease without esophagitis  Will switch from pantoprazole to esomeprazole to see if this works better for her.  Will take this twice a day before breakfast and supper and can continue famotidine at bedtime    - esomeprazole (NexIUM) 40 MG capsule; Take 1 capsule (40 mg total) by mouth 2 (two) times a day before meals  Dispense: 60 capsule; Refill: 3    2. Bloating  4. Nausea     Will perform SIBO breath test.  Contingency might include gastric emptying scan    - ondansetron (Zofran ODT) 4 mg disintegrating tablet; Take 1 tablet (4 mg total) by mouth every 6 (six) hours as needed for nausea or vomiting  Dispense: 20 tablet; Refill: 1      - Small intestinal bacterial overgrowth    3. Trammell's esophagus determined by biopsy  We discussed this diagnosis in detail including risk of progressing to esophageal cancer.  Will continue acid suppression and plan repeat EGD in a few years    - esomeprazole (NexIUM) 40 MG capsule; Take 1 capsule (40 mg total) by mouth 2 (two) times a day before meals  Dispense: 60 capsule; Refill: 3        ______________________________________________________________________    SUBJECTIVE: Shanice returns in follow-up of a number of symptoms including lower abdominal bloating, heartburn and nausea.  She is undergone fairly extensive evaluation including CT, abdominal ultrasound, CCK stimulated HIDA scan, EGD and colonoscopy.  She was noted to have a small hiatal hernia and Trammell's esophagus.  Has been on pantoprazole 40 mg twice daily with some improvement but persistent symptoms as above.  Not really any new symptoms.  No blood in her stool, unexplained weight loss, vomiting      REVIEW OF SYSTEMS:    Review of Systems   Constitutional: Negative for fever and weight loss.   Musculoskeletal:   Negative for myalgias.   Gastrointestinal:  Positive for abdominal pain, constipation, diarrhea, flatus, nausea and vomiting. Negative for anorexia, hematochezia and melena.   Genitourinary:  Positive for frequency. Negative for dysuria and hematuria.   Neurological:  Negative for headaches.    Answers submitted by the patient for this visit:  Abdominal Pain Questionnaire (Submitted on 2/23/2024)  Chief Complaint: Abdominal pain  Chronicity: chronic  Onset: more than 1 year ago  Onset quality: undetermined  Frequency: every several days  Episode duration: 4 Hours  Progression since onset: unchanged  Pain location: epigastric region  Pain - numeric: 5/10  Pain quality: burning  Radiates to: does not radiate  arthralgias: No  belching: Yes  Aggravated by: nothing  Relieved by: standing  Diagnostic workup: CT scan, GI consult, lower endoscopy, ultrasound, upper endoscopy        Historical Information   Past Medical History:   Diagnosis Date    Anxiety     Depression     Fatty liver     Hypercholesteremia     Hypertension      Past Surgical History:   Procedure Laterality Date    COLONOSCOPY      NO PAST SURGERIES      UPPER GASTROINTESTINAL ENDOSCOPY       Social History   Social History     Substance and Sexual Activity   Alcohol Use Yes    Comment: daily 1-2 glasses of wine     Social History     Substance and Sexual Activity   Drug Use Never     Social History     Tobacco Use   Smoking Status Former    Types: Cigarettes    Passive exposure: Past   Smokeless Tobacco Never     Family History   Problem Relation Age of Onset    Diverticulitis Paternal Grandmother        Meds/Allergies       Current Outpatient Medications:     ALPRAZolam (XANAX) 0.25 mg tablet    esomeprazole (NexIUM) 40 MG capsule    famotidine (PEPCID) 40 MG tablet    irbesartan (AVAPRO) 75 mg tablet    Multiple Vitamins-Minerals (MULTIVITAMIN ADULT EXTRA C PO)    ondansetron (Zofran ODT) 4 mg disintegrating tablet    Rosuvastatin Calcium (CRESTOR  "PO)    sertraline (ZOLOFT) 100 mg tablet    No Known Allergies        Objective     Blood pressure 139/91, pulse 78, height 4' 10\" (1.473 m), weight 61.3 kg (135 lb 3.2 oz). Body mass index is 28.26 kg/m².      PHYSICAL EXAM:      Physical Exam  Vitals and nursing note reviewed.   Constitutional:       General: She is not in acute distress.     Appearance: She is not ill-appearing.   HENT:      Head: Normocephalic and atraumatic.   Eyes:      General: No scleral icterus.     Extraocular Movements: Extraocular movements intact.   Cardiovascular:      Rate and Rhythm: Normal rate and regular rhythm.   Pulmonary:      Effort: Pulmonary effort is normal. No respiratory distress.   Abdominal:      General: There is no distension.      Palpations: Abdomen is soft.      Tenderness: There is no abdominal tenderness. There is no guarding or rebound.   Musculoskeletal:      Right lower leg: No edema.      Left lower leg: No edema.   Skin:     General: Skin is warm and dry.      Coloration: Skin is not cyanotic.      Findings: No erythema.   Neurological:      General: No focal deficit present.      Mental Status: She is alert and oriented to person, place, and time.   Psychiatric:         Mood and Affect: Mood normal.         Behavior: Behavior normal.          Lab Results:   No visits with results within 1 Day(s) from this visit.   Latest known visit with results is:   Hospital Outpatient Visit on 08/03/2023   Component Date Value    Case Report 08/03/2023                      Value:Surgical Pathology Report                         Case: [id number]                                   Authorizing Provider:  Lai Mcguire MD Collected:           08/03/2023 0908              Ordering Location:     [facility] Surgery   Received:            08/03/2023 1304                                     Center                                                                       Pathologist:           Carmen Stanton MD                 "                                                  Specimens:   A) - Duodenum, small bowel bx r/o duodenitis                                                        B) - Stomach, antrum bx r/o h pylori                                                                C) - Esophagus, lower esophagus bx r/o barretts                                            Final Diagnosis 08/03/2023                      Value:This result contains rich text formatting which cannot be displayed here.    Additional Information 08/03/2023                      Value:This result contains rich text formatting which cannot be displayed here.    Gross Description 08/03/2023                      Value:This result contains rich text formatting which cannot be displayed here.         Radiology Results:   No results found.

## 2024-02-29 NOTE — TELEPHONE ENCOUNTER
Message from [employer]    Closed    Close reason: Prior Authorization not required for patient/medication  Payer: [facility]    [phone]    [phone]  Note from payer: The patient currently has access to the requested medication and a Prior Authorization is not needed for the patient/medication.  View History  Medication Being Authorized    ondansetron (Zofran ODT) 4 mg disintegrating tablet

## 2024-05-16 DIAGNOSIS — R14.0 BLOATING: ICD-10-CM

## 2024-05-16 DIAGNOSIS — K21.9 GASTROESOPHAGEAL REFLUX DISEASE WITHOUT ESOPHAGITIS: ICD-10-CM

## 2024-05-16 DIAGNOSIS — R10.13 EPIGASTRIC PAIN: ICD-10-CM

## 2024-05-16 RX ORDER — FAMOTIDINE 40 MG/1
TABLET, FILM COATED ORAL
Qty: 60 TABLET | Refills: 5 | Status: SHIPPED | OUTPATIENT
Start: 2024-05-16

## 2024-05-30 ENCOUNTER — TELEPHONE (OUTPATIENT)
Dept: GASTROENTEROLOGY | Facility: CLINIC | Age: 64
End: 2024-05-30

## 2024-05-30 NOTE — TELEPHONE ENCOUNTER
I called patient to check on the status of her obtaining SIBO test as per last OV from 02/29/24. Her upcoming follow up in June was to follow up with SIBO testing. Per patient she has not completed the test yet, and states has been feeling a bit better. She requests to cancel June appt. She states she will complete the SIBO test and make another follow up. Patient rescheduled for 11/18/2024.

## 2024-10-21 ENCOUNTER — TELEPHONE (OUTPATIENT)
Dept: GASTROENTEROLOGY | Facility: CLINIC | Age: 64
End: 2024-10-21

## 2024-10-21 NOTE — TELEPHONE ENCOUNTER
Patient dropped off SIBO Test Kit Lot # [id number] Exp 06/30/2024 to [address] [facility] [address]. Demographics and Insurance information printed and sent with . ke's  to [place] [facility] for test analysis.

## 2024-10-24 ENCOUNTER — OFFICE VISIT (OUTPATIENT)
Dept: GASTROENTEROLOGY | Facility: CLINIC | Age: 64
End: 2024-10-24
Payer: COMMERCIAL

## 2024-10-24 DIAGNOSIS — R14.0 BLOATING: ICD-10-CM

## 2024-10-24 DIAGNOSIS — R11.0 NAUSEA: Primary | ICD-10-CM

## 2024-10-24 PROCEDURE — 91065 BREATH HYDROGEN/METHANE TEST: CPT | Performed by: INTERNAL MEDICINE

## 2024-10-24 PROCEDURE — PBNCHG PB NO CHARGE PLACEHOLDER: Performed by: INTERNAL MEDICINE

## 2024-10-24 NOTE — PROGRESS NOTES
[facility]       Bacterial Overgrowth Analytical Record    Arabella Mathews 63 y.o. female MRN: [id number]      Date of Test: 10/20/2024    Substrate Given: Lactulose    Ordering Provider: Delmer De Souza MD    Medical Assistant: Fanny Buenrostro    Symptoms: nausea, bloating    The patient presents for bacterial overgrowth testing.    Patient fasted overnight. Baseline readings obtained.   Breath test performed every 20 min for a total of 3 hr    Sample Clock Time ppmH2 ppmCH4 Co2% Jim   Baseline   9:00   3   6   3.8   1.44   #1  20 minutes   9:20   29   14   3.2   1.71   #2  40 minutes   9:40   20   10   3.8   1.44   #3  60 minutes   10:00   55   15   3.3   1.66   #4  80 minutes   10:20   55   15   3.3   1.66   #5  100 minutes   10:40   59   16   2.8   1.96   #6  120 minutes   11:00   51   16   3.1   1.77   #7  140 minutes   11:20   60   18   3.0   1.83   #8  160 minutes   11:42   55   20   2.5   2.20   #9  180 minutes   12:02   49   16   2.8   1.96       Physician interpretation: Results are suggestive of small intestine bacterial overgrowth (SIBO). Results forwarded to Dr. De Souza.

## 2024-10-31 ENCOUNTER — TELEPHONE (OUTPATIENT)
Age: 64
End: 2024-10-31

## 2024-10-31 ENCOUNTER — TELEPHONE (OUTPATIENT)
Dept: GASTROENTEROLOGY | Facility: CLINIC | Age: 64
End: 2024-10-31

## 2024-10-31 DIAGNOSIS — K63.8219 SMALL INTESTINAL BACTERIAL OVERGROWTH: Primary | ICD-10-CM

## 2024-10-31 NOTE — TELEPHONE ENCOUNTER
PA for Xifaxan 550 mg SUBMITTED     via    []CMM-KEY:   [x]Nick-Case ID #   [id number]    Payer: [facility]   Phone: [phone]   Fax: [phone]     []Availity-Auth ID # NDC #   []Faxed to plan   []Other website   []Phone call Case ID #     Office notes sent, clinical questions answered. Awaiting determination    Turnaround time for your insurance to make a decision on your Prior Authorization can take 7-21 business days.

## 2024-10-31 NOTE — TELEPHONE ENCOUNTER
----- Message from Delmer De Souza MD sent at 10/31/2024  2:25 PM EDT -----  Regarding: SIBO  Please call patient with results.  Breath test is positive for small intestinal bacterial overgrowth.  Will send prescription for Xifaxan

## 2024-11-01 NOTE — TELEPHONE ENCOUNTER
Pt calling back to advise pharmacy stated she has to pay $700 out of pocket. Advised pt that PA was submitted for xifaxan. Please f/u w/insurance and provide pt w/update. Pt needs to take meds for SIBO.

## 2024-11-01 NOTE — TELEPHONE ENCOUNTER
Spoke to pt regarding prescription, pt stated she went to  at pharmacy and it was not affordable. Pt had called in yesterday regarding medications (See encounter). Pt waiting to hear from prior auth team.             ll Conversations: PA for Xifaxan 550 mg SUBMITTED  (Oldest Message First)October 31, 2024  Halley Edouard MA CM    10/31/24  3:05 PM  Note     PA for Xifaxan 550 mg SUBMITTED      via     []CMM-KEY:   [x]Surescripts-Case ID #   [id number]     Payer: [employer]   Phone: [phone]   Fax: [phone]      []Availity-Auth ID # NDC #   []Faxed to plan   []Other website   []Phone call Case ID #      Office notes sent, clinical questions answered. Awaiting determination     Turnaround time for your insurance to make a decision on your Prior Authorization can take 7-21 business days.                                              November 1, 2024  [place]and[place] WRIHGT    11/1/24 10:56 AM  Note     Pt calling back to advise pharmacy stated she has to pay $700 out of pocket. Advised pt that PA was submitted for xifaxan. Please f/u w/insurance and provide pt w/update. Pt needs to take meds for SIBO.

## 2024-11-04 NOTE — TELEPHONE ENCOUNTER
PA for Xifaxan APPROVED     Date(s) approved Authorized from February 1, 2024 to December 31, 2024        Case #[id number]         Patient advised by          []"[employer]"hart Message  [x]Phone call voiced understanding  []LMOM  []L/M to call office as no active Communication consent on file  []Unable to leave detailed message as VM not approved on Communication consent       Pharmacy advised by    [x]Fax  []Phone call    Approval letter scanned into Media Yes

## 2024-11-18 ENCOUNTER — OFFICE VISIT (OUTPATIENT)
Dept: GASTROENTEROLOGY | Facility: CLINIC | Age: 64
End: 2024-11-18
Payer: COMMERCIAL

## 2024-11-18 VITALS
HEIGHT: 59 IN | DIASTOLIC BLOOD PRESSURE: 85 MMHG | HEART RATE: 83 BPM | BODY MASS INDEX: 26.21 KG/M2 | SYSTOLIC BLOOD PRESSURE: 114 MMHG | WEIGHT: 130 LBS

## 2024-11-18 DIAGNOSIS — R79.89 ELEVATED LFTS: ICD-10-CM

## 2024-11-18 DIAGNOSIS — K63.8219 SMALL INTESTINAL BACTERIAL OVERGROWTH (SIBO): ICD-10-CM

## 2024-11-18 DIAGNOSIS — K21.9 GASTROESOPHAGEAL REFLUX DISEASE WITHOUT ESOPHAGITIS: Primary | ICD-10-CM

## 2024-11-18 DIAGNOSIS — K63.5 POLYP OF COLON, UNSPECIFIED PART OF COLON, UNSPECIFIED TYPE: ICD-10-CM

## 2024-11-18 DIAGNOSIS — R10.13 EPIGASTRIC PAIN: ICD-10-CM

## 2024-11-18 PROCEDURE — 99214 OFFICE O/P EST MOD 30 MIN: CPT | Performed by: PHYSICIAN ASSISTANT

## 2024-11-18 RX ORDER — IRBESARTAN AND HYDROCHLOROTHIAZIDE 150; 12.5 MG/1; MG/1
TABLET, FILM COATED ORAL
COMMUNITY
Start: 2024-11-04

## 2024-11-18 NOTE — PROGRESS NOTES
[facility] - Outpatient Follow-up Note  Arabella Mathews 63 y.o. female MRN: [id number]  Encounter: [id number]          ASSESSMENT AND PLAN:      1. Gastroesophageal reflux disease without esophagitis (Primary)  Patient with history of GERD, continue Nexium in a.m., can take Pepcid in p.m. and then sometimes she will take this as needed throughout the day for breakthrough symptoms    2. Epigastric pain  Patient with epigastric pain, intermittently with EGD performed last year with possible Trammell's with repeat recommended in 2 to 3 years    3. Elevated LFTs  Patient with isolated alkaline phosphatase which has been around the same and previous workup has been negative thus far, continue to monitor    4. Polyp of colon, unspecified part of colon, unspecified type  Patient with history of colon polyps with colonoscopy as below last year and recommended repeat in 5 years    5.  SIBO  Patient with intermittent bloating, unfortunately not able to obtain Xifaxan due to price and she is going to try to work on getting samples but can try alternative if unavailable      We will see patient in 6 months.    ______________________________________________________________________    SUBJECTIVE:      63-year-old female who presents today for follow up office visit..  Patient was seen in ER in April for nausea vomiting and epigastric pain.  CAT scan was done at that time which showed no acute inflammatory changes.  Patient was noted at that time to have a mildly elevated alkaline phosphatase at 118 and AST of 59 otherwise LFTs were normal at that time. ALP was elevated at 169 with repeat labs. Serologic work up was normal, GGT was normal. RUQ US The common duct is borderline at 7 mm and similar to recent CT. If there is high degree of clinical suspicion, MRI/MRCP follow-up may be helpful. Equivocal smooth increased echogenicity of the liver suggests mild fatty infiltration.  Blood work in January of this  year did show elevated alkaline phosphatase of 150 with normal isoenzyme fractionation.  In April 2024, she  had colonoscopy which showed as well as 2 small internal hemorrhoids but all other observed locations were normal.  Polyps were tubular adenoma as well as hyperplastic polyp and recommended repeat colonoscopy in 5 years. EGD performed showing moderate abnormal mucosa in duodenal bulb and mild gastritis with small hiatal hernia.  Was prescribed pantoprazole.  Pathology from EGD did show possible Trammell's esophagus if the biopsies were taken greater than 1 cm above the EG junction, recommended repeat in 2-3 years.   Patient did have bacterial overgrowth testing which was positive but unfortunately could not get Xifaxan covered due to high co-pay.  Patient reports intermittent bloating symptoms as well as intermittent epigastric pain.  Patient is still taking the esomeprazole and also Pepcid 1-2 times per day. She otherwise states that she was trying to obtain samples of the Xifaxan  as her brother is a gastroenterologist but he has been having trouble obtaining it as well.            REVIEW OF SYSTEMS IS OTHERWISE NEGATIVE.      Historical Information   Past Medical History:   Diagnosis Date    Anxiety     Depression     Fatty liver     Hypercholesteremia     Hypertension      Past Surgical History:   Procedure Laterality Date    COLONOSCOPY      NO PAST SURGERIES      UPPER GASTROINTESTINAL ENDOSCOPY       Social History   Social History     Substance and Sexual Activity   Alcohol Use Yes    Comment: daily 1-2 glasses of wine     Social History     Substance and Sexual Activity   Drug Use Never     Social History     Tobacco Use   Smoking Status Former    Types: Cigarettes    Passive exposure: Past   Smokeless Tobacco Never     Family History   Problem Relation Age of Onset    Diverticulitis Paternal Grandmother        Meds/Allergies       Current Outpatient Medications:     ALPRAZolam (XANAX) 0.25 mg  tablet    esomeprazole (NexIUM) 40 MG capsule    famotidine (PEPCID) 40 MG tablet    irbesartan (AVAPRO) 75 mg tablet    Multiple Vitamins-Minerals (MULTIVITAMIN ADULT EXTRA C PO)    ondansetron (Zofran ODT) 4 mg disintegrating tablet    Rosuvastatin Calcium (CRESTOR PO)    sertraline (ZOLOFT) 100 mg tablet    No Known Allergies        Objective     There were no vitals taken for this visit. There is no height or weight on file to calculate BMI.      PHYSICAL EXAM:      General Appearance:   Alert, cooperative, no distress   HEENT:   Normocephalic, atraumatic, anicteric.     Neck:  Supple, symmetrical, trachea midline   Lungs:   Clear to auscultation bilaterally; no rales, rhonchi or wheezing; respirations unlabored    Heart::   Regular rate and rhythm; no murmur, rub, or gallop.   Abdomen:   Soft, non-tender, non-distended; normal bowel sounds; no masses, no organomegaly    Genitalia:   Deferred    Rectal:   Deferred    Extremities:  No cyanosis, clubbing or edema    Pulses:  2+ and symmetric    Skin:  No jaundice, rashes, or lesions    Lymph nodes:  No palpable cervical lymphadenopathy        Lab Results:   No visits with results within 1 Day(s) from this visit.   Latest known visit with results is:   Hospital Outpatient Visit on 08/03/2023   Component Date Value    Case Report 08/03/2023                      Value:Surgical Pathology Report                         Case: [id number]                                   Authorizing Provider:  Lai Mcguire MD Collected:           08/03/2023 0908              Ordering Location:     [facility]   Received:            08/03/2023 1304                                     Center                                                                       Pathologist:           Carmen Stanton MD                                                                  Specimens:   A) - Duodenum, small bowel bx r/o duodenitis                                                         B) - Stomach, antrum bx r/o h pylori                                                                C) - Esophagus, lower esophagus bx r/o barretts                                            Final Diagnosis 08/03/2023                      Value:A. Duodenum, Biopsy:                          - Benign duodenal mucosa without specific histopathologic abnormality.                          - No intraepithelial lymphocytosis and no villous blunting.                          - Negative for dysplasia.                                                    B. Stomach, Antrum, Biopsy:                          - Reactive antral gastritis/gastropathy.                          - Negative for intestinal metaplasia or dysplasia.                          - Immunohistochemistry for Helicobacter pylori is negative.                          - Immunohistochemistry for pan cytokeratin AE1/AE3 highlights benign                           epithelial cells.                                                    C. Esophagus, Lower esophagus, Biopsy:                          - Intestinal metaplasia present in benign cardia-type columnar mucosa.                          - Benign squamous mucosa with nonspecific regenerative & chronic                           inflammatory changes.                          - Eosinophils are fewer than 3 cells per high power field in squamous                           epithelium.                          - Negative for intestinal metaplasia or dysplasia.                          - Alcian blue/PAS special stain confirms the presence of goblet cells.                                                     Comment: This biopsy shows cardia-type mucosa with scattered goblet cells.                            The diagnosis in this case depends on the location of this biopsy.  If                           this biopsy was taken from the tubular esophagus at least 1 cm above the                           gastric folds, it  represents Trammell mucosa of the distinctive type.  If                           this biopsy was taken from the gastric cardia, it represents intestinal                           metaplasia of the gastric cardia.                                                     Reference: Gary NJ, Justa GW, Starla DG, Kodak LB; American College of                           Gastroenterology.  AGC Clinical Guideline: Diagnosis and Management of                           Trammell's Esophagus. Am J Gastroenterol. 2016 Jan;111(1)30-50.    Additional Information 08/03/2023                      Value:All reported additional testing was performed with appropriately reactive                           controls.  These tests were developed and their performance                           characteristics determined by [facility] or                           appropriate performing facility, though some tests may be performed on                           tissues which have not been validated for performance characteristics                           (such as staining performed on alcohol exposed cell blocks and decalcified                           tissues).  Results should be interpreted with caution and in the context                           of the patients’ clinical condition. These tests may not be cleared or                           approved by the U.S. Food and Drug Administration, though the FDA has                           determined that such clearance or approval is not necessary. These tests                           are used for clinical purposes and they should not be regarded as                           investigational or for research. This laboratory has been approved by CLIA                           88, designated as a high-complexity laboratory and is qualified to perform                           these tests.                                                    Interpretation performed at  "[facility], [address] [facility]                           [id number]    Gross Description 08/03/2023                      Value:A. The specimen is received in formalin, labeled with the patient's name                           and hospital number, and is designated \" small bowel\".  The specimen                           consists of 2 tan soft tissue fragments measuring 0.3 and 0.4 cm in                           greatest dimension.  Entirely submitted. One screened cassette.                          B. The specimen is received in formalin, labeled with the patient's name                           and hospital number, and is designated \" stomach antrum\".  The specimen                           consists of 3 tan soft tissue fragments measuring in range from 0.3 to 0.5                           cm in greatest dimension.  Entirely submitted. One screened cassette.                          C. The specimen is received in formalin, labeled with the patient's name                           and hospital number, and is designated \" lower esophagus\".  The specimen                           consists of 2 colorless soft tissue fragments measuring 0.2 to 0.7 cm in                           greatest dimension.  Entirely submitted. One screened cassette.                                                    Note: The estimated total formalin fixation time based upon information                           provided by the submitting clinician and the standard processing schedule                           is under 72 hours. [facility]         Radiology Results:   No results found.  "

## 2024-11-19 DIAGNOSIS — R14.0 BLOATING: ICD-10-CM

## 2024-11-19 DIAGNOSIS — K21.9 GASTROESOPHAGEAL REFLUX DISEASE WITHOUT ESOPHAGITIS: ICD-10-CM

## 2024-11-19 DIAGNOSIS — R10.13 EPIGASTRIC PAIN: ICD-10-CM

## 2024-11-19 DIAGNOSIS — K22.70 BARRETT'S ESOPHAGUS DETERMINED BY BIOPSY: ICD-10-CM

## 2024-11-19 RX ORDER — FAMOTIDINE 40 MG/1
TABLET, FILM COATED ORAL
Qty: 180 TABLET | Refills: 1 | Status: SHIPPED | OUTPATIENT
Start: 2024-11-19

## 2024-11-19 RX ORDER — ESOMEPRAZOLE MAGNESIUM 40 MG/1
CAPSULE, DELAYED RELEASE ORAL
Qty: 180 CAPSULE | Refills: 1 | Status: SHIPPED | OUTPATIENT
Start: 2024-11-19

## 2024-12-11 ENCOUNTER — TELEPHONE (OUTPATIENT)
Age: 64
End: 2024-12-11

## 2024-12-11 NOTE — TELEPHONE ENCOUNTER
Patients GI provider:  SHANTEL De Luna     Number to return call: [phone]    Reason for call: Pt calling asking if her SIBO test and results can be uploaded in to her [facility]. She can not see them now. Results from 10/24/24 Dr. Jean Baptiste    Scheduled procedure/appointment date if applicable: 11/18/24

## 2024-12-12 NOTE — TELEPHONE ENCOUNTER
I called and spoke to patient and explained to check under visits section of mychart and look for the visit with Dr. Thomas from 10/24/2024. She will recheck her mychart and call if she needs anything further from us. She appreciated the call.

## 2025-02-10 ENCOUNTER — OFFICE VISIT (OUTPATIENT)
Age: 65
End: 2025-02-10
Payer: COMMERCIAL

## 2025-02-10 VITALS — WEIGHT: 130 LBS | HEIGHT: 59 IN | BODY MASS INDEX: 26.21 KG/M2 | HEART RATE: 83 BPM | RESPIRATION RATE: 16 BRPM

## 2025-02-10 DIAGNOSIS — S90.221A SUBUNGUAL HEMATOMA OF RIGHT FOOT, INITIAL ENCOUNTER: ICD-10-CM

## 2025-02-10 DIAGNOSIS — S90.111A CONTUSION OF RIGHT GREAT TOE WITHOUT DAMAGE TO NAIL, INITIAL ENCOUNTER: Primary | ICD-10-CM

## 2025-02-10 PROCEDURE — 99202 OFFICE O/P NEW SF 15 MIN: CPT | Performed by: PODIATRIST

## 2025-02-10 NOTE — PROGRESS NOTES
Assessment/Plan: Dried subungual hematoma right hallux.  History of contusion.    Plan.  Chart reviewed.  PCP notes reviewed.  Patient evaluated.  Patient advised on aftercare.  Patient be started on vitamin B 5.  Watch for signs of infection or lysis.  Return as needed.         Diagnoses and all orders for this visit:    Contusion of right great toe without damage to nail, initial encounter    Subungual hematoma of right foot, initial encounter          Subjective: Patient is concerned with a dark spot on her right big toe.  It is under the toenail.  She believes she may have fungus.    No Known Allergies      Current Outpatient Medications:     ALPRAZolam (XANAX) 0.25 mg tablet, 3 (three) times a day as needed, Disp: , Rfl:     irbesartan-hydrochlorothiazide (AVALIDE) 150-12.5 MG per tablet, , Disp: , Rfl:     Multiple Vitamins-Minerals (MULTIVITAMIN ADULT EXTRA C PO), Take 1 tablet by mouth daily, Disp: , Rfl:     ondansetron (Zofran ODT) 4 mg disintegrating tablet, Take 1 tablet (4 mg total) by mouth every 6 (six) hours as needed for nausea or vomiting, Disp: 20 tablet, Rfl: 1    Rosuvastatin Calcium (CRESTOR PO), Take by mouth in the morning, Disp: , Rfl:     sertraline (ZOLOFT) 100 mg tablet, Take 100 mg by mouth daily, Disp: , Rfl:     esomeprazole (NexIUM) 40 MG capsule, TAKE 1 CAPSULE (40 MG TOTAL) BY MOUTH TWO (TWO) TIMES A DAY BEFORE MEALS (Patient not taking: Reported on 2/10/2025), Disp: 180 capsule, Rfl: 1    famotidine (PEPCID) 40 MG tablet, TAKE 1 TABLET (40 MG TOTAL) BY MOUTH TWO (TWO) TIMES A DAY (Patient not taking: Reported on 2/10/2025), Disp: 180 tablet, Rfl: 1    irbesartan (AVAPRO) 75 mg tablet, Take 75 mg by mouth daily at bedtime (Patient not taking: Reported on 2/10/2025), Disp: , Rfl:     There is no problem list on file for this patient.         Patient ID: Arabella Mathews is a 64 y.o. female.    HPI    The following portions of the patient's history were reviewed and updated as  appropriate:     family history includes Breast cancer in her sister; Cancer in her mother; Diverticulitis in her paternal grandmother; Hypertension in her father and maternal grandmother.      reports that she has quit smoking. Her smoking use included cigarettes. She has a 25 pack-year smoking history. She has been exposed to tobacco smoke. She has never used smokeless tobacco. She reports current alcohol use of about 5.0 standard drinks of alcohol per week. She reports that she does not use drugs.    Vitals:    02/10/25 1303   Pulse: 83   Resp: 16       Review of Systems      Objective:  Patient's shoes and socks removed.   Foot ExamPhysical Exam  Vitals and nursing note reviewed.   Constitutional:       Appearance: Normal appearance.   Cardiovascular:      Rate and Rhythm: Normal rate and regular rhythm.   Feet:      Comments: Right hallux demonstrates small area of dried subungual hematoma.  Negative lysis.  Negative paronychia or cellulitis.  Negative mycosis.  Skin:     Capillary Refill: Capillary refill takes less than 2 seconds.   Neurological:      Mental Status: She is alert.   Psychiatric:         Mood and Affect: Mood normal.         Behavior: Behavior normal.         Thought Content: Thought content normal.         Judgment: Judgment normal.

## 2025-04-16 ENCOUNTER — TELEPHONE (OUTPATIENT)
Age: 65
End: 2025-04-16

## 2025-04-16 NOTE — TELEPHONE ENCOUNTER
Due to a change in Providers schedule, pts 5/8/25 appt needs to be rescheduled.  Called pt hussein had to leave a message on her vm to return the call.

## 2025-04-18 ENCOUNTER — TELEPHONE (OUTPATIENT)
Age: 65
End: 2025-04-18

## 2025-04-18 NOTE — TELEPHONE ENCOUNTER
Due to a change in the Providers schedule, pts 5/8/25 appt needs to be rescheduled.  I called the pt and she stated she is seeing a different GI Specialist and to just cancel the appt.